# Patient Record
Sex: MALE | Race: WHITE | NOT HISPANIC OR LATINO | URBAN - METROPOLITAN AREA
[De-identification: names, ages, dates, MRNs, and addresses within clinical notes are randomized per-mention and may not be internally consistent; named-entity substitution may affect disease eponyms.]

---
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
## 2025-01-09 ENCOUNTER — HOSPITAL ENCOUNTER (OUTPATIENT)
Facility: MEDICAL CENTER | Age: 20
End: 2025-01-10
Attending: EMERGENCY MEDICINE | Admitting: STUDENT IN AN ORGANIZED HEALTH CARE EDUCATION/TRAINING PROGRAM
Payer: COMMERCIAL

## 2025-01-09 DIAGNOSIS — R45.851 SUICIDAL IDEATION: ICD-10-CM

## 2025-01-09 DIAGNOSIS — T50.902A INTENTIONAL DRUG OVERDOSE, INITIAL ENCOUNTER (HCC): ICD-10-CM

## 2025-01-09 PROBLEM — F99 CO-OCCURRENCE OF MULTIPLE PSYCHIATRIC DISORDERS: Status: ACTIVE | Noted: 2025-01-09

## 2025-01-09 PROBLEM — T14.91XA SUICIDE ATTEMPT (HCC): Status: ACTIVE | Noted: 2025-01-09

## 2025-01-09 LAB
AMPHET UR QL SCN: NEGATIVE
ANION GAP SERPL CALC-SCNC: 14 MMOL/L (ref 7–16)
APAP SERPL-MCNC: <5 UG/ML (ref 10–30)
BARBITURATES UR QL SCN: NEGATIVE
BASOPHILS # BLD AUTO: 0.4 % (ref 0–1.8)
BASOPHILS # BLD: 0.04 K/UL (ref 0–0.12)
BENZODIAZ UR QL SCN: NEGATIVE
BUN SERPL-MCNC: 14 MG/DL (ref 8–22)
BZE UR QL SCN: NEGATIVE
CALCIUM SERPL-MCNC: 8.9 MG/DL (ref 8.5–10.5)
CANNABINOIDS UR QL SCN: NEGATIVE
CHLORIDE SERPL-SCNC: 110 MMOL/L (ref 96–112)
CO2 SERPL-SCNC: 19 MMOL/L (ref 20–33)
CREAT SERPL-MCNC: 0.9 MG/DL (ref 0.5–1.4)
EKG IMPRESSION: NORMAL
EKG IMPRESSION: NORMAL
EOSINOPHIL # BLD AUTO: 0.08 K/UL (ref 0–0.51)
EOSINOPHIL NFR BLD: 0.8 % (ref 0–6.9)
ERYTHROCYTE [DISTWIDTH] IN BLOOD BY AUTOMATED COUNT: 41.3 FL (ref 35.9–50)
ETHANOL BLD-MCNC: 10.9 MG/DL
FENTANYL UR QL: NEGATIVE
GFR SERPLBLD CREATININE-BSD FMLA CKD-EPI: 126 ML/MIN/1.73 M 2
GLUCOSE BLD STRIP.AUTO-MCNC: 105 MG/DL (ref 65–99)
GLUCOSE SERPL-MCNC: 97 MG/DL (ref 65–99)
HCT VFR BLD AUTO: 40.7 % (ref 42–52)
HGB BLD-MCNC: 13.5 G/DL (ref 14–18)
IMM GRANULOCYTES # BLD AUTO: 0.04 K/UL (ref 0–0.11)
IMM GRANULOCYTES NFR BLD AUTO: 0.4 % (ref 0–0.9)
LYMPHOCYTES # BLD AUTO: 2.39 K/UL (ref 1–4.8)
LYMPHOCYTES NFR BLD: 25.1 % (ref 22–41)
MCH RBC QN AUTO: 28.7 PG (ref 27–33)
MCHC RBC AUTO-ENTMCNC: 33.2 G/DL (ref 32.3–36.5)
MCV RBC AUTO: 86.4 FL (ref 81.4–97.8)
METHADONE UR QL SCN: NEGATIVE
MONOCYTES # BLD AUTO: 0.79 K/UL (ref 0–0.85)
MONOCYTES NFR BLD AUTO: 8.3 % (ref 0–13.4)
NEUTROPHILS # BLD AUTO: 6.17 K/UL (ref 1.82–7.42)
NEUTROPHILS NFR BLD: 65 % (ref 44–72)
NRBC # BLD AUTO: 0 K/UL
NRBC BLD-RTO: 0 /100 WBC (ref 0–0.2)
OPIATES UR QL SCN: NEGATIVE
OXYCODONE UR QL SCN: NEGATIVE
PCP UR QL SCN: NEGATIVE
PLATELET # BLD AUTO: 315 K/UL (ref 164–446)
PMV BLD AUTO: 10.8 FL (ref 9–12.9)
POC BREATHALIZER: 0 PERCENT (ref 0–0.01)
POTASSIUM SERPL-SCNC: 4.2 MMOL/L (ref 3.6–5.5)
PROPOXYPH UR QL SCN: NEGATIVE
RBC # BLD AUTO: 4.71 M/UL (ref 4.7–6.1)
SALICYLATES SERPL-MCNC: <1 MG/DL (ref 15–25)
SODIUM SERPL-SCNC: 143 MMOL/L (ref 135–145)
TSH SERPL-ACNC: 0.95 UIU/ML (ref 0.35–5.5)
VIT B12 SERPL-MCNC: 319 PG/ML (ref 211–911)
WBC # BLD AUTO: 9.5 K/UL (ref 4.8–10.8)

## 2025-01-09 PROCEDURE — 82607 VITAMIN B-12: CPT

## 2025-01-09 PROCEDURE — 302970 POC BREATHALIZER: Performed by: EMERGENCY MEDICINE

## 2025-01-09 PROCEDURE — 80048 BASIC METABOLIC PNL TOTAL CA: CPT

## 2025-01-09 PROCEDURE — 700111 HCHG RX REV CODE 636 W/ 250 OVERRIDE (IP): Mod: JZ,UD | Performed by: EMERGENCY MEDICINE

## 2025-01-09 PROCEDURE — 82077 ASSAY SPEC XCP UR&BREATH IA: CPT

## 2025-01-09 PROCEDURE — 84443 ASSAY THYROID STIM HORMONE: CPT

## 2025-01-09 PROCEDURE — 99285 EMERGENCY DEPT VISIT HI MDM: CPT

## 2025-01-09 PROCEDURE — 700111 HCHG RX REV CODE 636 W/ 250 OVERRIDE (IP): Mod: JZ,UD | Performed by: STUDENT IN AN ORGANIZED HEALTH CARE EDUCATION/TRAINING PROGRAM

## 2025-01-09 PROCEDURE — 80307 DRUG TEST PRSMV CHEM ANLYZR: CPT

## 2025-01-09 PROCEDURE — 36415 COLL VENOUS BLD VENIPUNCTURE: CPT

## 2025-01-09 PROCEDURE — 82652 VIT D 1 25-DIHYDROXY: CPT

## 2025-01-09 PROCEDURE — G0378 HOSPITAL OBSERVATION PER HR: HCPCS

## 2025-01-09 PROCEDURE — 99291 CRITICAL CARE FIRST HOUR: CPT | Performed by: STUDENT IN AN ORGANIZED HEALTH CARE EDUCATION/TRAINING PROGRAM

## 2025-01-09 PROCEDURE — 99222 1ST HOSP IP/OBS MODERATE 55: CPT | Performed by: PSYCHIATRY & NEUROLOGY

## 2025-01-09 PROCEDURE — 96375 TX/PRO/DX INJ NEW DRUG ADDON: CPT

## 2025-01-09 PROCEDURE — 93010 ELECTROCARDIOGRAM REPORT: CPT | Performed by: INTERNAL MEDICINE

## 2025-01-09 PROCEDURE — 96376 TX/PRO/DX INJ SAME DRUG ADON: CPT

## 2025-01-09 PROCEDURE — 93005 ELECTROCARDIOGRAM TRACING: CPT | Mod: TC

## 2025-01-09 PROCEDURE — 80143 DRUG ASSAY ACETAMINOPHEN: CPT

## 2025-01-09 PROCEDURE — 93005 ELECTROCARDIOGRAM TRACING: CPT | Mod: TC | Performed by: EMERGENCY MEDICINE

## 2025-01-09 PROCEDURE — 85025 COMPLETE CBC W/AUTO DIFF WBC: CPT

## 2025-01-09 PROCEDURE — 82962 GLUCOSE BLOOD TEST: CPT

## 2025-01-09 PROCEDURE — 96374 THER/PROPH/DIAG INJ IV PUSH: CPT

## 2025-01-09 PROCEDURE — 80179 DRUG ASSAY SALICYLATE: CPT

## 2025-01-09 RX ORDER — ACETAMINOPHEN 325 MG/1
650 TABLET ORAL EVERY 6 HOURS PRN
Status: DISCONTINUED | OUTPATIENT
Start: 2025-01-09 | End: 2025-01-10 | Stop reason: HOSPADM

## 2025-01-09 RX ORDER — ONDANSETRON 2 MG/ML
4 INJECTION INTRAMUSCULAR; INTRAVENOUS ONCE
Status: COMPLETED | OUTPATIENT
Start: 2025-01-09 | End: 2025-01-09

## 2025-01-09 RX ORDER — PROPRANOLOL HYDROCHLORIDE 10 MG/1
10 TABLET ORAL
Status: ON HOLD | COMMUNITY
End: 2025-01-10

## 2025-01-09 RX ORDER — PROCHLORPERAZINE EDISYLATE 5 MG/ML
10 INJECTION INTRAMUSCULAR; INTRAVENOUS EVERY 6 HOURS PRN
Status: DISCONTINUED | OUTPATIENT
Start: 2025-01-09 | End: 2025-01-10 | Stop reason: HOSPADM

## 2025-01-09 RX ORDER — QUETIAPINE FUMARATE 25 MG/1
25 TABLET, FILM COATED ORAL EVERY EVENING
Status: ON HOLD | COMMUNITY
End: 2025-01-10

## 2025-01-09 RX ADMIN — ONDANSETRON 4 MG: 2 INJECTION INTRAMUSCULAR; INTRAVENOUS at 03:29

## 2025-01-09 RX ADMIN — ONDANSETRON 4 MG: 2 INJECTION INTRAMUSCULAR; INTRAVENOUS at 04:16

## 2025-01-09 RX ADMIN — PROCHLORPERAZINE EDISYLATE 10 MG: 5 INJECTION INTRAMUSCULAR; INTRAVENOUS at 15:21

## 2025-01-09 RX ADMIN — PROCHLORPERAZINE EDISYLATE 10 MG: 5 INJECTION INTRAMUSCULAR; INTRAVENOUS at 06:14

## 2025-01-09 SDOH — ECONOMIC STABILITY: TRANSPORTATION INSECURITY
IN THE PAST 12 MONTHS, HAS THE LACK OF TRANSPORTATION KEPT YOU FROM MEDICAL APPOINTMENTS OR FROM GETTING MEDICATIONS?: NO

## 2025-01-09 SDOH — ECONOMIC STABILITY: TRANSPORTATION INSECURITY
IN THE PAST 12 MONTHS, HAS LACK OF RELIABLE TRANSPORTATION KEPT YOU FROM MEDICAL APPOINTMENTS, MEETINGS, WORK OR FROM GETTING THINGS NEEDED FOR DAILY LIVING?: NO

## 2025-01-09 ASSESSMENT — PATIENT HEALTH QUESTIONNAIRE - PHQ9
SUM OF ALL RESPONSES TO PHQ9 QUESTIONS 1 AND 2: 5
4. FEELING TIRED OR HAVING LITTLE ENERGY: MORE THAN HALF THE DAYS
2. FEELING DOWN, DEPRESSED, IRRITABLE, OR HOPELESS: NEARLY EVERY DAY
9. THOUGHTS THAT YOU WOULD BE BETTER OFF DEAD, OR OF HURTING YOURSELF: MORE THAN HALF THE DAYS
1. LITTLE INTEREST OR PLEASURE IN DOING THINGS: MORE THAN HALF THE DAYS
5. POOR APPETITE OR OVEREATING: MORE THAN HALF THE DAYS
SUM OF ALL RESPONSES TO PHQ QUESTIONS 1-9: 21
3. TROUBLE FALLING OR STAYING ASLEEP OR SLEEPING TOO MUCH: MORE THAN HALF THE DAYS
7. TROUBLE CONCENTRATING ON THINGS, SUCH AS READING THE NEWSPAPER OR WATCHING TELEVISION: NEARLY EVERY DAY
8. MOVING OR SPEAKING SO SLOWLY THAT OTHER PEOPLE COULD HAVE NOTICED. OR THE OPPOSITE, BEING SO FIGETY OR RESTLESS THAT YOU HAVE BEEN MOVING AROUND A LOT MORE THAN USUAL: NEARLY EVERY DAY
6. FEELING BAD ABOUT YOURSELF - OR THAT YOU ARE A FAILURE OR HAVE LET YOURSELF OR YOUR FAMILY DOWN: MORE THAN HALF THE DAYS

## 2025-01-09 ASSESSMENT — ENCOUNTER SYMPTOMS
ABDOMINAL PAIN: 0
SHORTNESS OF BREATH: 0
COUGH: 0
HEADACHES: 0
HEARTBURN: 0
DIZZINESS: 0
FEVER: 0
CHILLS: 0
VOMITING: 1
DEPRESSION: 1
DIARRHEA: 0
PALPITATIONS: 0
NAUSEA: 1

## 2025-01-09 ASSESSMENT — LIFESTYLE VARIABLES
EVER FELT BAD OR GUILTY ABOUT YOUR DRINKING: NO
ALCOHOL_USE: YES
EVER HAD A DRINK FIRST THING IN THE MORNING TO STEADY YOUR NERVES TO GET RID OF A HANGOVER: NO
DOES PATIENT WANT TO STOP DRINKING: NO
TOTAL SCORE: 1
HAVE PEOPLE ANNOYED YOU BY CRITICIZING YOUR DRINKING: NO
AVERAGE NUMBER OF DAYS PER WEEK YOU HAVE A DRINK CONTAINING ALCOHOL: 0
ON A TYPICAL DAY WHEN YOU DRINK ALCOHOL HOW MANY DRINKS DO YOU HAVE: 1
TOTAL SCORE: 1
HOW MANY TIMES IN THE PAST YEAR HAVE YOU HAD 5 OR MORE DRINKS IN A DAY: 0
TOTAL SCORE: 1
CONSUMPTION TOTAL: NEGATIVE
HAVE YOU EVER FELT YOU SHOULD CUT DOWN ON YOUR DRINKING: YES

## 2025-01-09 ASSESSMENT — SOCIAL DETERMINANTS OF HEALTH (SDOH)
WITHIN THE LAST YEAR, HAVE TO BEEN RAPED OR FORCED TO HAVE ANY KIND OF SEXUAL ACTIVITY BY YOUR PARTNER OR EX-PARTNER?: NO
WITHIN THE LAST YEAR, HAVE YOU BEEN AFRAID OF YOUR PARTNER OR EX-PARTNER?: NO
WITHIN THE PAST 12 MONTHS, THE FOOD YOU BOUGHT JUST DIDN'T LAST AND YOU DIDN'T HAVE MONEY TO GET MORE: NEVER TRUE
IN THE PAST 12 MONTHS, HAS THE ELECTRIC, GAS, OIL, OR WATER COMPANY THREATENED TO SHUT OFF SERVICE IN YOUR HOME?: NO
WITHIN THE PAST 12 MONTHS, YOU WORRIED THAT YOUR FOOD WOULD RUN OUT BEFORE YOU GOT THE MONEY TO BUY MORE: NEVER TRUE
WITHIN THE LAST YEAR, HAVE YOU BEEN HUMILIATED OR EMOTIONALLY ABUSED IN OTHER WAYS BY YOUR PARTNER OR EX-PARTNER?: NO
WITHIN THE LAST YEAR, HAVE YOU BEEN KICKED, HIT, SLAPPED, OR OTHERWISE PHYSICALLY HURT BY YOUR PARTNER OR EX-PARTNER?: NO

## 2025-01-09 ASSESSMENT — COGNITIVE AND FUNCTIONAL STATUS - GENERAL
CLIMB 3 TO 5 STEPS WITH RAILING: TOTAL
SUGGESTED CMS G CODE MODIFIER MOBILITY: CK
DAILY ACTIVITIY SCORE: 13
STANDING UP FROM CHAIR USING ARMS: A LOT
MOVING TO AND FROM BED TO CHAIR: A LOT
PERSONAL GROOMING: A LOT
CLIMB 3 TO 5 STEPS WITH RAILING: A LOT
DRESSING REGULAR UPPER BODY CLOTHING: A LOT
MOBILITY SCORE: 16
STANDING UP FROM CHAIR USING ARMS: A LOT
SUGGESTED CMS G CODE MODIFIER DAILY ACTIVITY: CL
EATING MEALS: A LITTLE
SUGGESTED CMS G CODE MODIFIER MOBILITY: CL
MOVING FROM LYING ON BACK TO SITTING ON SIDE OF FLAT BED: A LOT
DRESSING REGULAR LOWER BODY CLOTHING: A LOT
MOVING TO AND FROM BED TO CHAIR: A LOT
HELP NEEDED FOR BATHING: A LOT
HELP NEEDED FOR BATHING: A LITTLE
WALKING IN HOSPITAL ROOM: A LOT
TURNING FROM BACK TO SIDE WHILE IN FLAT BAD: A LITTLE
TOILETING: A LOT
DRESSING REGULAR LOWER BODY CLOTHING: A LOT
MOBILITY SCORE: 11
WALKING IN HOSPITAL ROOM: TOTAL

## 2025-01-09 NOTE — ED TRIAGE NOTES
"Homero Mullen   19 y.o.     Chief Complaint   Patient presents with    Drug Overdose     Pt PRIYANKA FOSTER coming from home for attempted suicide by taking medications. Pt states they took oxycarbazepine, vraylar, Seroquel, oxycodone and propanolol. Poison control has been called by EMS and ED RN.   Pt is awake and alert upon arrival to ED. Mild chest pain and tiredness.     Suicidal Ideation     Pt has hx of depression with SI thoughts starting in 2018. Pt states they used to want to run into traffic but no longer think that. Pt's plan was overdosing on medications. When asked if pt is still suicidal they answer saying now they are \"just tired\"     Pt is alert, oriented, and follows commands. Pt speaking in full sentences and responds appropriately to questions. No acute distress noted. Respirations are even and unlabored.     EMS interventions: 1 L NS.    Vitals:    01/09/25 0055   BP: 131/76   Pulse: 97   Resp: 18   Temp: 36.1 °C (97 °F)   SpO2: 96%       "

## 2025-01-09 NOTE — ED NOTES
Pt transported to T7 with ACLS RN connected to monitor. 1:1 sitter to floor with pt. Family at bedside.

## 2025-01-09 NOTE — PROGRESS NOTES
I will be off duty and signed out of voalte at 3pm.  If you have any needs for this patient after 3pm, please reach out to the on call Juliane FINNEGAN.  Thank you!

## 2025-01-09 NOTE — ASSESSMENT & PLAN NOTE
Suicide attempt by drug overdose, see above.  History of suicidal ideation but no previous attempts  IP consult to psych placed, on legal hold

## 2025-01-09 NOTE — ED NOTES
Medication history reviewed with patient's mom at bedside. Patient unable to participate in interview, last doses unknown.  Med rec is complete  Allergies reviewed.     Mom denies any outpatient antibiotics in the last 30 days.     Anticoagulants taken in the last 14 days? No    Patient has active prescription for Strattera 40 mg and 60 mg on rafael, unsure which strength patient currently taking    Radha Manzano PhT

## 2025-01-09 NOTE — ED NOTES
Break RN Breathalyzer 0.00   Subjective


Date Seen by a Provider:  Mar 5, 2019


Time Seen by a Provider:  06:34


Subjective/Events-last exam


Pain ok, already up and walking,  No complaints.





Objective


Exam





Vital Signs








  Date Time  Temp Pulse Resp B/P (MAP) Pulse Ox O2 Delivery O2 Flow Rate FiO2


 


3/5/19 04:41 97.5 79 16 109/64 (79) 99 Room Air  


 


3/5/19 00:00 97.6 60 16 99/65 (76) 99 Room Air  


 


3/4/19 21:00     99 Nasal Cannula 1.50 


 


3/4/19 11:50 98.0 69 18 132/94 98 Room Air  


 


3/4/19 11:46      Room Air  














I & O 


 


 3/5/19





 07:00


 


Intake Total 1888 ml


 


Output Total 1325 ml


 


Balance 563 ml





Capillary Refill :


General Appearance:  No Apparent Distress


Neck:  Non Tender, Supple


Respiratory:  No Accessory Muscle Use, No Respiratory Distress


Cardiovascular:  Regular Rate, Rhythm, Normal Peripheral Pulses


Gastrointestinal:  soft


Extremity:  No Calf Tenderness


Neurologic/Psychiatric:  Alert, Oriented x3, No Motor/Sensory Deficits





Assessment/Plan


Assessment/Plan


Assess & Plan/Chief Complaint


Lumbar Stenosis, Neuro Claudication





Plan: Up with PT, D/C Drain after lunch and D/C Home


Instructions Given





Clinical Quality Measures


DVT/VTE Risk/Contraindication:


Risk Factor Score Per Nursin


RFS Level Per Nursing on Admit:  1=Low/No VTE PPX











SHEREE JOINER MD Mar 5, 2019 06:35

## 2025-01-09 NOTE — ED PROVIDER NOTES
"ED Provider Note    CHIEF COMPLAINT  Chief Complaint   Patient presents with    Drug Overdose     Pt PRIYANKA FOSTER coming from home for attempted suicide by taking medications. Pt states they took oxycarbazepine, vraylar, Seroquel, oxycodone and propanolol. Poison control has been called by EMS and ED RN.   Pt is awake and alert upon arrival to ED. Mild chest pain and tiredness.     Suicidal Ideation       EXTERNAL RECORDS REVIEWED  Outpatient Notes outpatient pulmonary appointment 2/21/2024 reviewed    HPI/ROS  LIMITATION TO HISTORY   Select: : None  OUTSIDE HISTORIAN(S):  Parent mother    Homero Mullen is a 19 y.o. person who presents to the emergency department after intentional drug overdose.  Patient has taken multitude of prescription medications with direct intent for self-harm for \"lethal dose \".  Explains that ingestion included oxcarbazepine, Vraylar, Seroquel, oxycodone and propranolol.  Ingestion at 11:30 PM.  Reports ingestion and intent was secondary to some strained relationships over the last few days.  Did have appointment with outpatient psychiatric provider 2 days ago but things continue to escalate mentally.  Mother at bedside providing additional history        PAST MEDICAL HISTORY       SURGICAL HISTORY  patient denies any surgical history    FAMILY HISTORY  Family History   Problem Relation Age of Onset    Genetic Disorder Father         chromosomal anomaly       SOCIAL HISTORY  Social History     Tobacco Use    Smoking status: Never    Smokeless tobacco: Never   Vaping Use    Vaping status: Never Used   Substance and Sexual Activity    Alcohol use: Never    Drug use: Not on file    Sexual activity: Not on file       CURRENT MEDICATIONS  Home Medications       Reviewed by Ivory Edward R.N. (Registered Nurse) on 01/09/25 at 0056  Med List Status: Partial     Medication Last Dose Status   atomoxetine (STRATTERA) 60 MG capsule  Active   estradiol (ESTRACE) 2 MG Tab  Active   hydrOXYzine HCl " "(ATARAX) 50 MG Tab  Active   minocycline (MINOCIN) 100 MG Cap  Active   OXcarbazepine (TRILEPTAL) 150 MG Tab  Active   risperiDONE (RISPERDAL) 0.5 MG Tab  Active   SODIUM FLUORIDE 5000 PPM 1.1 % Gel  Active   spironolactone (ALDACTONE) 25 MG Tab  Active                    ALLERGIES  No Known Allergies    PHYSICAL EXAM  VITAL SIGNS: BP 98/61   Pulse 73   Temp 36.1 °C (97 °F) (Temporal)   Resp 16   Ht 1.803 m (5' 11\")   Wt 79.4 kg (175 lb)   SpO2 99%   BMI 24.41 kg/m²      Pulse ox interpretation: I interpret this pulse ox as normal.  Constitutional: Alert in no apparent distress.  HENT: No signs of trauma, Bilateral external ears normal, Nose normal.   Eyes: Pupils are equal and reactive  Neck: Normal range of motion, No tenderness, Supple  Cardiovascular: Regular rate and rhythm, no murmurs.   Thorax & Lungs: Normal breath sounds, No respiratory distress  Abdomen: Bowel sounds normal, Soft, No tenderness  Skin: Warm, Dry  Musculoskeletal: Good range of motion in all major joints. No tenderness to palpation or major deformities noted.   Neurologic: Alert , Normal motor function, Normal sensory function, No focal deficits noted.   Psychiatric: Depressed affect.  Poor eye contact.  Admits to intent to harm self        EKG/LABS  Results for orders placed or performed during the hospital encounter of 01/09/25   Urine Drug Screen    Collection Time: 01/09/25  1:48 AM   Result Value Ref Range    Amphetamines Urine Negative Negative    Barbiturates Negative Negative    Benzodiazepines Negative Negative    Cocaine Metabolite Negative Negative    Fentanyl, Urine Negative Negative    Methadone Negative Negative    Opiates Negative Negative    Oxycodone Negative Negative    Phencyclidine -Pcp Negative Negative    Propoxyphene Negative Negative    Cannabinoid Metab Negative Negative   CBC WITH DIFFERENTIAL    Collection Time: 01/09/25  1:50 AM   Result Value Ref Range    WBC 9.5 4.8 - 10.8 K/uL    RBC 4.71 4.70 - 6.10 " M/uL    Hemoglobin 13.5 (L) 14.0 - 18.0 g/dL    Hematocrit 40.7 (L) 42.0 - 52.0 %    MCV 86.4 81.4 - 97.8 fL    MCH 28.7 27.0 - 33.0 pg    MCHC 33.2 32.3 - 36.5 g/dL    RDW 41.3 35.9 - 50.0 fL    Platelet Count 315 164 - 446 K/uL    MPV 10.8 9.0 - 12.9 fL    Neutrophils-Polys 65.00 44.00 - 72.00 %    Lymphocytes 25.10 22.00 - 41.00 %    Monocytes 8.30 0.00 - 13.40 %    Eosinophils 0.80 0.00 - 6.90 %    Basophils 0.40 0.00 - 1.80 %    Immature Granulocytes 0.40 0.00 - 0.90 %    Nucleated RBC 0.00 0.00 - 0.20 /100 WBC    Neutrophils (Absolute) 6.17 1.82 - 7.42 K/uL    Lymphs (Absolute) 2.39 1.00 - 4.80 K/uL    Monos (Absolute) 0.79 0.00 - 0.85 K/uL    Eos (Absolute) 0.08 0.00 - 0.51 K/uL    Baso (Absolute) 0.04 0.00 - 0.12 K/uL    Immature Granulocytes (abs) 0.04 0.00 - 0.11 K/uL    NRBC (Absolute) 0.00 K/uL   BASIC METABOLIC PANEL    Collection Time: 01/09/25  1:50 AM   Result Value Ref Range    Sodium 143 135 - 145 mmol/L    Potassium 4.2 3.6 - 5.5 mmol/L    Chloride 110 96 - 112 mmol/L    Co2 19 (L) 20 - 33 mmol/L    Glucose 97 65 - 99 mg/dL    Bun 14 8 - 22 mg/dL    Creatinine 0.90 0.50 - 1.40 mg/dL    Calcium 8.9 8.5 - 10.5 mg/dL    Anion Gap 14.0 7.0 - 16.0   DIAGNOSTIC ALCOHOL    Collection Time: 01/09/25  1:50 AM   Result Value Ref Range    Diagnostic Alcohol 10.9 (H) <10.1 mg/dL   Salicylate    Collection Time: 01/09/25  1:50 AM   Result Value Ref Range    Salicylates, Quant. <1.0 (L) 15.0 - 25.0 mg/dL   ACETAMINOPHEN    Collection Time: 01/09/25  1:50 AM   Result Value Ref Range    Acetaminophen -Tylenol <5.0 (L) 10.0 - 30.0 ug/mL   ESTIMATED GFR    Collection Time: 01/09/25  1:50 AM   Result Value Ref Range    GFR (CKD-EPI) 126 >60 mL/min/1.73 m 2   POC BREATHALIZER    Collection Time: 01/09/25  2:25 AM   Result Value Ref Range    POC Breathalizer 0.00 0.00 - 0.01 Percent   EKG (NOW)    Collection Time: 01/09/25  2:43 AM   Result Value Ref Range    Report       [facility]    Test Date:  2025  Pt Name:    PABLO SINGLETON               Department: ER  MRN:        [id number]                      Room:       RD 01  Gender:     Male                         Technician: [id number]  :        2005                   Requested By:PARISH ASHLEY  Order #:    [id number]                    Reading MD: Parish Ashley    Measurements  Intervals                                Axis  Rate:       92                           P:          79  DC:         151                          QRS:        69  QRSD:       96                           T:          31  QT:         348  QTc:        431    Interpretive Statements  Sinus rhythm  Borderline Q waves in lateral leads  No previous ECG available for comparison  Electronically Signed On 2025 02:43:05 PST by Parish Ashley     POCT glucose device results    Collection Time: 25  3:23 AM   Result Value Ref Range    POC Glucose, Blood 105 (H) 65 - 99 mg/dL       I have independently interpreted this EKG      COURSE & MEDICAL DECISION MAKING    ASSESSMENT, COURSE AND PLAN  Care Narrative: will complete psych. / tox workup.     ED OBS: Yes; I am placing the patient in to an observation status due to a diagnostic uncertainty as well as therapeutic intensity. Patient placed in observation status at 0115 AM, 2025.     Patient presenting with intentional drug ingestion and overdose.  Will complete workup.  Poison control contacted from triage    Patient has had increasing symptoms to include nausea.  Appears pale cool and diaphoretic.  Vital signs have somewhat worsened with decreasing heart rate and blood pressure.  Will admit to the hospital service at this time as patient is not medically cleared.  Patient will remain on legal hold          DISPOSITION AND DISCUSSIONS  I have discussed management of the patient with the following physicians and EFREN's: 0 410: Discussed with hospitalist which will admit    19-year-old presenting with above  presentation.  Initial workup as above.  Given vital signs and symptoms will admit to the hospitalist service.  Will await further medical clearance for further behavioral health service intervention.    CRITICAL CARE  The very real possibilty of a deterioration of this patient's condition required the highest level of my preparedness for sudden, emergent intervention.  I provided critical care services, which included medication orders, frequent reevaluations of the patient's condition and response to treatment, ordering and reviewing test results, and discussing the case with various consultants.  The critical care time associated with the care of the patient was 35 minutes. Review chart for interventions. This time is exclusive of any other billable procedures.     FINAL DIAGNOSIS  1. Intentional drug overdose, initial encounter ([facility])    2. Suicidal ideation         Electronically signed by: Parish Daniels M.D., 1/9/2025 1:27 AM

## 2025-01-09 NOTE — PROGRESS NOTES
Pt transported to T731-02, vitals signs taken, placed on tele monitor. Pt continues to be lethargic, but easily arousable. 1:1 sitter at bedside, all legal hold precautions in place.

## 2025-01-09 NOTE — PROGRESS NOTES
[facility] Medicine Daily Progress Note    Date of Service  1/9/2025    Chief Complaint  Homero Mullen is a 19 y.o. person admitted 1/9/2025 with intentional overdose.    Hospital Course  Homero Mullen is a 19 y.o. person who presented 1/9/2025 with suicide attempt by drug overdose.  History per chart review and mother as patient is altered.  PMH of psychiatric disorders.  Patient took oxcarbazepine, cariprazine, quetiapine, oxycodone and propranolol at around 11:30 PM.  Increased stress over the past few days, patient's mother received a text just saying I am sorry.  Patient has a group of friends on [employer] and one of them recently committed suicide.     Interval Problem Update  1/9 afebrile, vitals stable, on room air.  Has vomited up a significant amount per nursing.  He is resting upon my exam.  Mom at bedside, tearful.  All questions answered.    I have discussed this patient's plan of care and discharge plan at IDT rounds today with Case Management, Nursing, Nursing leadership, and other members of the IDT team.    Consultants/Specialty  psychiatry    Code Status  Full Code    Disposition  The patient is not medically cleared for discharge to home or a post-acute facility.  Anticipate discharge to: home with close outpatient follow-up    I have placed the appropriate orders for post-discharge needs.    Review of Systems  Review of Systems   Constitutional:  Negative for chills, fever and malaise/fatigue.   Respiratory:  Negative for cough and shortness of breath.    Cardiovascular:  Negative for chest pain, palpitations and leg swelling.   Gastrointestinal:  Positive for nausea and vomiting. Negative for abdominal pain, diarrhea and heartburn.   Genitourinary:  Negative for dysuria, frequency and urgency.   Neurological:  Negative for dizziness and headaches.   Psychiatric/Behavioral:  Positive for depression and suicidal ideas.    All other systems reviewed and are negative.       Physical Exam  Temp:  [36.1  °C (97 °F)] 36.1 °C (97 °F)  Pulse:  [73-97] 81  Resp:  [14-20] 16  BP: ()/(55-93) 114/67  SpO2:  [95 %-100 %] 97 %    Physical Exam  Vitals and nursing note reviewed.   Constitutional:       Appearance: Normal appearance. Homero is not ill-appearing.   HENT:      Head: Normocephalic and atraumatic.      Jaw: There is normal jaw occlusion.      Right Ear: Hearing normal.      Left Ear: Hearing normal.      Nose: Nose normal.      Mouth/Throat:      Lips: Pink.      Mouth: Mucous membranes are moist.   Eyes:      Extraocular Movements: Extraocular movements intact.      Conjunctiva/sclera: Conjunctivae normal.      Pupils: Pupils are equal, round, and reactive to light.   Neck:      Vascular: No carotid bruit.   Cardiovascular:      Rate and Rhythm: Normal rate and regular rhythm.      Pulses: Normal pulses.      Heart sounds: Normal heart sounds, S1 normal and S2 normal.   Pulmonary:      Effort: Pulmonary effort is normal.      Breath sounds: Normal breath sounds and air entry. No stridor.   Musculoskeletal:      Cervical back: Normal range of motion and neck supple.      Right lower leg: No edema.      Left lower leg: No edema.   Skin:     General: Skin is warm and dry.      Capillary Refill: Capillary refill takes less than 2 seconds.   Neurological:      General: No focal deficit present.      Mental Status: Homero is alert and oriented to person, place, and time. Mental status is at baseline.      Sensory: Sensation is intact.      Motor: Motor function is intact.   Psychiatric:         Thought Content: Thought content includes suicidal ideation. Thought content includes suicidal plan.         Fluids  No intake or output data in the 24 hours ending 01/09/25 0719     Laboratory  Recent Labs     01/09/25  0150   WBC 9.5   RBC 4.71   HEMOGLOBIN 13.5*   HEMATOCRIT 40.7*   MCV 86.4   MCH 28.7   MCHC 33.2   RDW 41.3   PLATELETCT 315   MPV 10.8     Recent Labs     01/09/25  0150   SODIUM 143   POTASSIUM 4.2    CHLORIDE 110   CO2 19*   GLUCOSE 97   BUN 14   CREATININE 0.90   CALCIUM 8.9                   Imaging  No orders to display        Assessment/Plan  * Drug overdose, intentional self-harm, initial encounter (HCC)- (present on admission)  Assessment & Plan  Patient took oxcarbazepine, cariprazine, quetiapine, oxycodone and propranolol at around 11:30 PM on 1/8  Poison control contacted by ED staff who recommends monitoring on telemetry but  If QRS becomes prolonged due to propranolol will order bicarb  If QTc becomes prolonged will order magnesium  Need to monitor for urinary retention and constipation    Suicide attempt (HCC)- (present on admission)  Assessment & Plan  Suicide attempt by drug overdose, see above.  History of suicidal ideation but no previous attempts  IP consult to psych placed, on legal hold    Co-occurrence of multiple psychiatric disorders- (present on admission)  Assessment & Plan  Hold home meds for now         VTE prophylaxis:   SCDs/TEDs      I have performed a physical exam and reviewed and updated ROS and Plan today (1/9/2025). In review of yesterday's note (1/8/2025), there are no changes except as documented above.

## 2025-01-09 NOTE — ED NOTES
Legal hold placed 1/8/25 @0112. Pt's mom is at bedside and explained POC.     1:1 sitter in line of sight, items removed from room for safety.

## 2025-01-09 NOTE — H&P
Hospital Medicine History & Physical Note    Date of Service  1/9/2025    Primary Care Physician  Ernestina Galvez M.D.  Code Status  Full Code    Chief Complaint  Chief Complaint   Patient presents with    Drug Overdose     Pt PRIYANKA FOSTER coming from home for attempted suicide by taking medications. Pt states they took oxycarbazepine, vraylar, Seroquel, oxycodone and propanolol. Poison control has been called by EMS and ED RN.   Pt is awake and alert upon arrival to ED. Mild chest pain and tiredness.     Suicidal Ideation       History of Presenting Illness  Homero Mullen is a 19 y.o. person who presented 1/9/2025 with suicide attempt by drug overdose.  History per chart review and mother as patient is altered.  PMH of psychiatric disorders.  Patient took oxcarbazepine, cariprazine, quetiapine, oxycodone and propranolol at around 11:30 PM.  Increased stress over the past few days, patient's mother received a text just saying I am sorry.  Patient has a group of friends on discord and one of them recently committed suicide.    I discussed the plan of care with EDP.    Review of Systems  Review of Systems   Unable to perform ROS: Mental status change     Past Medical History  Psychiatric disorders    Surgical History   has no past surgical history on file.     Family History  family history includes Genetic Disorder in Homero's father.   Family history reviewed with patient. There is no family history that is pertinent to the chief complaint.     Social History   reports that Homero has never smoked. Homero has never used smokeless tobacco. Homero reports that Homero does not drink alcohol.    Allergies  No Known Allergies    Medications  Prior to Admission Medications   Prescriptions Last Dose Informant Patient Reported? Taking?   OXcarbazepine (TRILEPTAL) 150 MG Tab   Yes No   Sig: Take 1 tablet twice a day by oral route for 30 days.   SODIUM FLUORIDE 5000 PPM 1.1 % Gel   Yes No   atomoxetine (STRATTERA) 60 MG  "capsule   Yes No   Sig: TAKE ONE CAPSULE BY MOUTH ONE TIME DAILY for 30 days   estradiol (ESTRACE) 2 MG Tab   Yes No   Sig: Take 2 mg by mouth 2 times a day.   hydrOXYzine HCl (ATARAX) 50 MG Tab   Yes No   minocycline (MINOCIN) 100 MG Cap   Yes No   Sig: Take 100 mg by mouth 2 times a day.   risperiDONE (RISPERDAL) 0.5 MG Tab   Yes No   spironolactone (ALDACTONE) 25 MG Tab   Yes No   Sig: Take 50 mg by mouth 2 times a day.      Facility-Administered Medications: None       Physical Exam  Temp:  [36.1 °C (97 °F)] 36.1 °C (97 °F)  Pulse:  [73-97] 80  Resp:  [14-20] 16  BP: ()/(55-76) 106/72  SpO2:  [95 %-100 %] 100 %  Blood Pressure: 98/61   Temperature: 36.1 °C (97 °F)   Pulse: 73   Respiration: 16   Pulse Oximetry: 99 %       Physical Exam  Constitutional:       Appearance: Homero is diaphoretic.   HENT:      Head: Normocephalic and atraumatic.      Mouth/Throat:      Mouth: Mucous membranes are moist.      Pharynx: No oropharyngeal exudate or posterior oropharyngeal erythema.   Cardiovascular:      Rate and Rhythm: Normal rate and regular rhythm.      Pulses: Normal pulses.      Heart sounds: Normal heart sounds. No murmur heard.  Pulmonary:      Effort: Pulmonary effort is normal. No respiratory distress.      Breath sounds: Normal breath sounds.   Musculoskeletal:         General: No swelling or tenderness. Normal range of motion.   Skin:     General: Skin is warm.      Coloration: Skin is pale.   Neurological:      Mental Status: Homero is alert.      Comments: Somnolent but responds to name         Laboratory:  Recent Labs     01/09/25  0150   WBC 9.5   RBC 4.71   HEMOGLOBIN 13.5*   HEMATOCRIT 40.7*   MCV 86.4   MCH 28.7   MCHC 33.2   RDW 41.3   PLATELETCT 315   MPV 10.8     Recent Labs     01/09/25  0150   SODIUM 143   POTASSIUM 4.2   CHLORIDE 110   CO2 19*   GLUCOSE 97   BUN 14   CREATININE 0.90   CALCIUM 8.9     Recent Labs     01/09/25  0150   GLUCOSE 97         No results for input(s): \"NTPROBNP\" in " "the last 72 hours.      No results for input(s): \"TROPONINT\" in the last 72 hours.    Imaging:  No orders to display       EKG:  I have personally reviewed the images and compared with prior images. and My impression is: NSR    Assessment/Plan:  Justification for Admission Status  I anticipate this patient is appropriate for observation status at this time because suicide attempt by medication overdose, vital stable at this time    * Drug overdose, intentional self-harm, initial encounter (HCC)- (present on admission)  Assessment & Plan  Patient took oxcarbazepine, cariprazine, quetiapine, oxycodone and propranolol at around 11:30 PM on 1/8  Poison control contacted by ED staff who recommends monitoring on telemetry but  If QRS becomes prolonged due to propranolol will order bicarb  If QTc becomes prolonged will order magnesium  Need to monitor for urinary retention and constipation    Suicide attempt (HCC)- (present on admission)  Assessment & Plan  Suicide attempt by drug overdose, see above.  History of suicidal ideation but no previous attempts  IP consult to psych placed, on legal hold    Co-occurrence of multiple psychiatric disorders- (present on admission)  Assessment & Plan  Hold home meds for now    Patient is critically ill.   The patient continues to have: Suicide attempt by multiple drug overdose including antipsychotics and propranolol  If untreated there is a high chance of deterioration And eventually death.   The critical care that I am providing today is: As above.  Need to monitor very closely on telemetry especially with beta-blocker overdose  The critical that has been undertaken is medically complex.   There has been no overlap in critical care time.   Critical Care Time not including procedures: 32 minutes    "

## 2025-01-09 NOTE — ASSESSMENT & PLAN NOTE
Patient took oxcarbazepine, cariprazine, quetiapine, oxycodone and propranolol at around 11:30 PM on 1/8  Poison control contacted by ED staff who recommends monitoring on telemetry but  If QRS becomes prolonged due to propranolol will order bicarb  If QTc becomes prolonged will order magnesium  Need to monitor for urinary retention and constipation

## 2025-01-09 NOTE — ED NOTES
Per pt's mom  Pt's psychiatrist is Dr. Yelitza Estrada at [facility] [phone].    Pt's Therapist is Tamiko Summers at [facility] [phone]

## 2025-01-09 NOTE — HOSPITAL COURSE
Homero Mullen is a 19 y.o. person who presented 1/9/2025 with suicide attempt by drug overdose.  History per chart review and mother as patient is altered.  PMH of psychiatric disorders.  Patient took oxcarbazepine, cariprazine, quetiapine, oxycodone and propranolol at around 11:30 PM on 01/08.  Increased stress over the past few days, patient's mother received a text just saying I am sorry.  Patient has a group of friends on discord and one of them recently committed suicide. No st/t abnormalities on EKG. No changes in QRS or Qtc while monitored on telemetry. No evidence of urinary retention or constipation. Tolerating diet without complication. Psychiatry consulted and recommended transfer pt to inpatient psychiatric hospital. Patients psychotropic medications were held in setting of overdose. Patient was determined satisfactory for discharge with appropriate follow up.

## 2025-01-09 NOTE — PROGRESS NOTES
Pharmacy Note: Poison control updated for polypharmacy OD overdose    Poison control case #:[id number]    Patient took oxcarbazepine, vraylar, seroquel and propranolol in SI attempt.    Labs:  Recent Labs     01/09/25  0150   SODIUM 143   POTASSIUM 4.2   CHLORIDE 110   CO2 19*   BUN 14   CREATININE 0.90   GLUCOSE 97   CALCIUM 8.9            Levels:   01/09/25 01:50   Acetaminophen -Tylenol <5.0 (L)   Diagnostic Alcohol 10.9 (H)   Salicylates, Quant. <1.0 (L)       Recommended Plan:  1. Monitor for hypotension and bradycardia: give fluids and initiate vasopressors to maintain appropriate MAP. Consider atropine for symptomatic bradycardia.    2. If seizures develop given benzodiazepines    3. EKG to monitor QRS. IF QRS becomes > 120 give 1-2 amps of sodium bicarb and optimize electrolytes    4. Observation status for at least 8 hours and asymptomatic.     Albert Mary, PharmD

## 2025-01-09 NOTE — ED NOTES
Bedside report received from prior RN. Pt resting. Resp normal/unlabored on RA. Bed side rails up/in low position.    Family at bedside, POC given    All items removed from room for safety and to minimize risk of suicide. Verified that Legal Hold appropriate and signed in patient's chart. Confirmed patient's personal items removed from room, and if applicable labeled/placed in secure area.        1:1 Observation. Continuous visual monitoring by Trained Personnel. Sitter has unobstructed view of patient at all times. Discussion with sitter about patient care, safety, and support.   Color consistent with ethnicity/race, warm, dry intact, resilient. Ecchymosis and possible deep tissue injury to left upper arm with no tenderness. Mild swelling to area. Pt denies trauma, states it is "leukemia."

## 2025-01-09 NOTE — ED NOTES
Spoke to poison Control Ranjana BONNER     Case# [id number]  Suggestions for Care per Poison Control:     Continuous cardiac monitoring   IV fluids for hypotension  Use vasopressors as needed  Symptomatic juan ---use atropine   With propanolol you will see: QRS widening and seizures.   If QRS is >120 tx with sodium bicarb with 1-2 amps IV push and repeat EKG     With Seroquel   QTC prolongation, >500 1-2 g of IV mag and optimize electrolytes.   Benzos -- For seizures and hypothermia, tachycardia and agitation   Agitation --- bladder scan because they can retain urine.     Observation time is a minimum 8 hours and asymptomatic.

## 2025-01-09 NOTE — ED NOTES
Spoke to poison Control Ranjana BONNER    Case# [id number]  Suggestions for Care per Poison Control:     Continuous cardiac monitoring   IV fluids for hypotension  Use vasopressors as needed  Symptomatic juan ---use atropine   With propanolol you will see: QRS widening and seizures.   If QRS is >120 tx with sodium bicarb with 1-2 amps IV push and repeat EKG    With Seroquel   QTC prolongation, >500 1-2 g of IV mag and optimize electrolytes.   Benzos -- For seizures and hypothermia, tachycardia and agitation   Agitation --- bladder scan because they can retain urine.

## 2025-01-09 NOTE — ED NOTES
Pt ambulated to the bathroom with a steady gait. Denies dizziness/lightheadedness. No balance issues observed. Pt changing into paper scrubs.

## 2025-01-09 NOTE — PROGRESS NOTES
4 Eyes Skin Assessment Completed by HA Cardozo and HA Maldonado.    Head WDL  Ears Jaundice  Nose WDL  Mouth WDL  Neck WDL  Breast/Chest WDL  Shoulder Blades WDL  Spine Redness and Blanching  (R) Arm/Elbow/Hand WDL  (L) Arm/Elbow/Hand WDL  Abdomen WDL  Groin WDL  Scrotum/Coccyx/Buttocks WDL  (R) Leg WDL  (L) Leg WDL  (R) Heel/Foot/Toe WDL  (L) Heel/Foot/Toe WDL          Devices In Places Tele Box, Blood Pressure Cuff, and Pulse Ox      Interventions In Place Pillows    Possible Skin Injury No    Pictures Uploaded Into Epic N/A  Wound Consult Placed N/A  RN Wound Prevention Protocol Ordered No

## 2025-01-09 NOTE — CONSULTS
PSYCHIATRIC INTAKE EVALUATION(new)  Reason for admission: SA by OD on Vraylar, seroquel, trileptal, oxycodone and propranolol   Chief Complaint   Patient presents with    Drug Overdose     Pt PRIYANKA FOSTER coming from home for attempted suicide by taking medications. Pt states they took oxycarbazepine, vraylar, Seroquel, oxycodone and propanolol. Poison control has been called by EMS and ED RN.   Pt is awake and alert upon arrival to ED. Mild chest pain and tiredness.     Suicidal Ideation       Reason for consult:suicide attempt  Requesting Provider:   Bibi Walker M.D.      Legal Hold Status:    on hold         Chart reviewed.         *HPI:     Pt is very sedated, difficult to arouse. Per sitter, pt has been sedated all morning.       Medical ROS (as pertinent):     ROS  unable to obtain       *Psychiatric Examination:  Vitals:   Vitals:    01/09/25 0800   BP: 97/54   Pulse: 80   Resp: 14   Temp:    SpO2: 95%     Unable to assess MSE at this time due to heavy sedation.              Past Medical History:   No past medical history on file.     Past Psychiatric History: per chart  Previous Diagnosis:hx of depression with SI starting in 2018; hx of anxiety. Dx of ASD, AUSTIN on CPAP, KANU   Current meds:strattera, atarax, trileptal, risperdal  Outpatient services:pt's psychiatrist is Dr Yelitza Estrada at [facility] [phone], therapist Tamiko Summers at [facility] [phone]      Family Hx: per chart sister has special needs     Social Hx: per chart: pt was home schooled and has a hx of sensitivity to sensory input; he gets anxious in social situations. He has a sister who has special needs.     Substances:unable to obtain       Current Medications:  Current Facility-Administered Medications   Medication Dose Route Frequency Provider Last Rate Last Admin    acetaminophen (Tylenol) tablet 650 mg  650 mg Oral Q6HRS PRN Bibi Walker M.D.        prochlorperazine (Compazine) injection 10 mg  10  mg Intravenous Q6HRS PRN Bibi Walker M.D.   10 mg at 01/09/25 0614     Current Outpatient Medications   Medication Sig Dispense Refill    cariprazine (VRAYLAR) 1.5 MG capsule Take 1.5 mg by mouth every day.      QUEtiapine (SEROQUEL) 25 MG Tab Take 25 mg by mouth every evening.      estradiol (ESTRACE) 2 MG Tab Take 4 mg by mouth 2 times a day. 4 mg = 2 tablets      spironolactone (ALDACTONE) 100 MG Tab Take 100 mg by mouth 2 times a day.      OXcarbazepine (TRILEPTAL) 150 MG Tab Take 150 mg by mouth 2 times a day.      ATOMOXETINE HCL PO Take 1 Capsule by mouth every day.      hydrOXYzine HCl (ATARAX) 50 MG Tab Take 50 mg by mouth at bedtime.          Allergies:  Patient has no known allergies.       Labs personally reviewed: UDS negative  Recent Results (from the past 72 hours)   Urine Drug Screen    Collection Time: 01/09/25  1:48 AM   Result Value Ref Range    Amphetamines Urine Negative Negative    Barbiturates Negative Negative    Benzodiazepines Negative Negative    Cocaine Metabolite Negative Negative    Fentanyl, Urine Negative Negative    Methadone Negative Negative    Opiates Negative Negative    Oxycodone Negative Negative    Phencyclidine -Pcp Negative Negative    Propoxyphene Negative Negative    Cannabinoid Metab Negative Negative   CBC WITH DIFFERENTIAL    Collection Time: 01/09/25  1:50 AM   Result Value Ref Range    WBC 9.5 4.8 - 10.8 K/uL    RBC 4.71 4.70 - 6.10 M/uL    Hemoglobin 13.5 (L) 14.0 - 18.0 g/dL    Hematocrit 40.7 (L) 42.0 - 52.0 %    MCV 86.4 81.4 - 97.8 fL    MCH 28.7 27.0 - 33.0 pg    MCHC 33.2 32.3 - 36.5 g/dL    RDW 41.3 35.9 - 50.0 fL    Platelet Count 315 164 - 446 K/uL    MPV 10.8 9.0 - 12.9 fL    Neutrophils-Polys 65.00 44.00 - 72.00 %    Lymphocytes 25.10 22.00 - 41.00 %    Monocytes 8.30 0.00 - 13.40 %    Eosinophils 0.80 0.00 - 6.90 %    Basophils 0.40 0.00 - 1.80 %    Immature Granulocytes 0.40 0.00 - 0.90 %    Nucleated RBC 0.00 0.00 - 0.20 /100 WBC    Neutrophils  (Absolute) 6.17 1.82 - 7.42 K/uL    Lymphs (Absolute) 2.39 1.00 - 4.80 K/uL    Monos (Absolute) 0.79 0.00 - 0.85 K/uL    Eos (Absolute) 0.08 0.00 - 0.51 K/uL    Baso (Absolute) 0.04 0.00 - 0.12 K/uL    Immature Granulocytes (abs) 0.04 0.00 - 0.11 K/uL    NRBC (Absolute) 0.00 K/uL   BASIC METABOLIC PANEL    Collection Time: 25  1:50 AM   Result Value Ref Range    Sodium 143 135 - 145 mmol/L    Potassium 4.2 3.6 - 5.5 mmol/L    Chloride 110 96 - 112 mmol/L    Co2 19 (L) 20 - 33 mmol/L    Glucose 97 65 - 99 mg/dL    Bun 14 8 - 22 mg/dL    Creatinine 0.90 0.50 - 1.40 mg/dL    Calcium 8.9 8.5 - 10.5 mg/dL    Anion Gap 14.0 7.0 - 16.0   DIAGNOSTIC ALCOHOL    Collection Time: 25  1:50 AM   Result Value Ref Range    Diagnostic Alcohol 10.9 (H) <10.1 mg/dL   Salicylate    Collection Time: 25  1:50 AM   Result Value Ref Range    Salicylates, Quant. <1.0 (L) 15.0 - 25.0 mg/dL   ACETAMINOPHEN    Collection Time: 25  1:50 AM   Result Value Ref Range    Acetaminophen -Tylenol <5.0 (L) 10.0 - 30.0 ug/mL   ESTIMATED GFR    Collection Time: 25  1:50 AM   Result Value Ref Range    GFR (CKD-EPI) 126 >60 mL/min/1.73 m 2   POC BREATHALIZER    Collection Time: 25  2:25 AM   Result Value Ref Range    POC Breathalizer 0.00 0.00 - 0.01 Percent   EKG (NOW)    Collection Time: 25  2:43 AM   Result Value Ref Range    Report       [facility] Emergency Dept.    Test Date:  2025  Pt Name:    PABLO SINGLETON               Department: ER  MRN:        [id number]                      Room:       [facility]  Gender:     Male                         Technician: [id number]  :        2005                   Requested By:PARISH ASHLEY  Order #:    [id number]                    Reading MD: Parish Ashley    Measurements  Intervals                                Axis  Rate:       92                           P:          79  MN:         151                          QRS:        69  QRSD:       96                            T:          31  QT:         348  QTc:        431    Interpretive Statements  Sinus rhythm  Borderline Q waves in lateral leads  No previous ECG available for comparison  Electronically Signed On 01- 02:43:05 PST by Parish Daniels     POCT glucose device results    Collection Time: 01/09/25  3:23 AM   Result Value Ref Range    POC Glucose, Blood 105 (H) 65 - 99 mg/dL           EKG: , SR  Brain Imaging: not done   EEG:  n/a         Assessment:Pt with a hx of MDD, KANU and ASD, who attempted SA by OD in the context of strained relationship. Pt today is heavily sedated, unable to participate in evaluation. Due to serious SA, t at this time continues to have an acute elevated risk of danger to self and others, therefore will extend legal hold for further psychiatric stabilization.         Dx:  Unspecified depressive disorder   R/o MDD  Hx of KANU  Hx of ASD    Medical:    AUSTIN on CPAP    Plan:  Legal hold:extended  Psychotropic medications: hold psychotropic medications at this time due to OD. Monitor for NMS and other SE.   Please transfer pt to inpatient psychiatric hospital when medically cleared and bed is available  Pt is being referred to inpatient psychotherapy with Dr Toney  Labs:obtain TSH, B12 and Vit D  Old records ordered/reviewed/summarized   Discussed the case with: KAIN Perez   Psychiatry will follow up    Thank you for the consult.     Sitter:1:1  Phone:no  Visitors:parents only, supervised  Personal belongings: no    This note was created using voice recognition software (Dragon). The accuracy of the dictation is limited by the abilities of the software. I have reviewed the note prior to signing. However, error related to voice recognition software and /or scribes may still exist and should be interpreted within the appropriate context.

## 2025-01-10 VITALS
HEART RATE: 85 BPM | OXYGEN SATURATION: 95 % | HEIGHT: 71 IN | RESPIRATION RATE: 18 BRPM | WEIGHT: 188.49 LBS | TEMPERATURE: 98.6 F | SYSTOLIC BLOOD PRESSURE: 132 MMHG | BODY MASS INDEX: 26.39 KG/M2 | DIASTOLIC BLOOD PRESSURE: 87 MMHG

## 2025-01-10 LAB
ANION GAP SERPL CALC-SCNC: 14 MMOL/L (ref 7–16)
BUN SERPL-MCNC: 9 MG/DL (ref 8–22)
CALCIUM SERPL-MCNC: 8.5 MG/DL (ref 8.5–10.5)
CHLORIDE SERPL-SCNC: 108 MMOL/L (ref 96–112)
CO2 SERPL-SCNC: 20 MMOL/L (ref 20–33)
CREAT SERPL-MCNC: 0.6 MG/DL (ref 0.5–1.4)
ERYTHROCYTE [DISTWIDTH] IN BLOOD BY AUTOMATED COUNT: 44.9 FL (ref 35.9–50)
GFR SERPLBLD CREATININE-BSD FMLA CKD-EPI: 142 ML/MIN/1.73 M 2
GLUCOSE SERPL-MCNC: 85 MG/DL (ref 65–99)
HCT VFR BLD AUTO: 38.3 % (ref 42–52)
HGB BLD-MCNC: 12.2 G/DL (ref 14–18)
MCH RBC QN AUTO: 28.4 PG (ref 27–33)
MCHC RBC AUTO-ENTMCNC: 31.9 G/DL (ref 32.3–36.5)
MCV RBC AUTO: 89.1 FL (ref 81.4–97.8)
PLATELET # BLD AUTO: 244 K/UL (ref 164–446)
PMV BLD AUTO: 10.6 FL (ref 9–12.9)
POTASSIUM SERPL-SCNC: 3.7 MMOL/L (ref 3.6–5.5)
RBC # BLD AUTO: 4.3 M/UL (ref 4.7–6.1)
SODIUM SERPL-SCNC: 142 MMOL/L (ref 135–145)
WBC # BLD AUTO: 9 K/UL (ref 4.8–10.8)

## 2025-01-10 PROCEDURE — 99239 HOSP IP/OBS DSCHRG MGMT >30: CPT | Performed by: INTERNAL MEDICINE

## 2025-01-10 PROCEDURE — G0378 HOSPITAL OBSERVATION PER HR: HCPCS

## 2025-01-10 PROCEDURE — 36415 COLL VENOUS BLD VENIPUNCTURE: CPT

## 2025-01-10 PROCEDURE — 96376 TX/PRO/DX INJ SAME DRUG ADON: CPT

## 2025-01-10 PROCEDURE — 700111 HCHG RX REV CODE 636 W/ 250 OVERRIDE (IP): Mod: JZ,UD | Performed by: STUDENT IN AN ORGANIZED HEALTH CARE EDUCATION/TRAINING PROGRAM

## 2025-01-10 PROCEDURE — 80048 BASIC METABOLIC PNL TOTAL CA: CPT

## 2025-01-10 PROCEDURE — 85027 COMPLETE CBC AUTOMATED: CPT

## 2025-01-10 RX ADMIN — PROCHLORPERAZINE EDISYLATE 10 MG: 5 INJECTION INTRAMUSCULAR; INTRAVENOUS at 05:58

## 2025-01-10 ASSESSMENT — ENCOUNTER SYMPTOMS
CHILLS: 0
VOMITING: 0
DIZZINESS: 0
DOUBLE VISION: 0
SORE THROAT: 0
ABDOMINAL PAIN: 0
PALPITATIONS: 0
HEADACHES: 0
DIARRHEA: 0
NAUSEA: 0
DEPRESSION: 1
COUGH: 0
BLURRED VISION: 0
FEVER: 0
SHORTNESS OF BREATH: 0

## 2025-01-10 NOTE — DISCHARGE PLANNING
Alert Team/Behavioral Health   Note:      POWER ALERT TEAM DISCHARGE PLANNING NOTE    Date:  1/10/25  Patient Name:  Homero Mullen - 19 y.o. - Discharge Planning  MRN:  [id number]   YOB: 2005  ADMISSION DATE:  1/9/2025     Writer forwarded referral packet for inpatient psychiatric care to the following community providers:  [facility], Abdirashid SOLIZ    Items included in the referral packet:   _x____Face Sheet   _x____Pages 1 and 2 of completed legal hold   _x____Alert Team/Psych Assessment   _x____H&P   _x____UDS   _x____Blood Alcohol   _x____Vital signs   _n/a____Pregnancy Test (if applicable)   _x____Medications List   _____Covid Screen

## 2025-01-10 NOTE — DISCHARGE PLANNING
Alert Team/Behavioral Health   Note:      - [facility]:  (Rhoda) called. Needs provider note from today indicating medical clearance and inquired if patient has own CPAP machine. Notified Attending Provider (James DOMINGUEZ), Bedside RN (Elaine WALKER), RN Case Manager (Jaziel TSE), and Alert Team DPA (Mariza CARDOZA) via Voalte message.  @1340: RN Case Manager (Jaziel TSE) reports that talked to patient and patient has not used their CPAP in a year. Notified Intake Counselor (Adali) at [facility] and still awaiting provider note.  @1425: Talked to Intake Counselor (Gino). They will get back to us when they have an update.

## 2025-01-10 NOTE — DISCHARGE SUMMARY
Discharge Summary    CHIEF COMPLAINT ON ADMISSION  Chief Complaint   Patient presents with    Drug Overdose     Pt PRIYANKA FOSTER coming from home for attempted suicide by taking medications. Pt states they took oxycarbazepine, vraylar, Seroquel, oxycodone and propanolol. Poison control has been called by EMS and ED RN.   Pt is awake and alert upon arrival to ED. Mild chest pain and tiredness.     Suicidal Ideation       Reason for Admission  ems     Admission Date  1/9/2025    CODE STATUS  Full Code    HPI & HOSPITAL COURSE  Homero Mullen is a 19 y.o. person who presented 1/9/2025 with suicide attempt by drug overdose.  History per chart review and mother as patient is altered.  PMH of psychiatric disorders.  Patient took oxcarbazepine, cariprazine, quetiapine, oxycodone and propranolol at around 11:30 PM on 01/08.  Increased stress over the past few days, patient's mother received a text just saying I am sorry.  Patient has a group of friends on discord and one of them recently committed suicide. No st/t abnormalities on EKG. No changes in QRS or Qtc while monitored on telemetry. No evidence of urinary retention or constipation. Tolerating diet without complication. Psychiatry consulted and recommended transfer pt to inpatient psychiatric hospital. Patients psychotropic medications were held in setting of overdose. Patient was determined satisfactory for discharge with appropriate follow up.    Therefore, Homero is discharged in fair and stable condition to a psychiatric hospital.    The patient recovered much more quickly than anticipated on admission.    Discharge Date  01/10/2025    FOLLOW UP ITEMS POST DISCHARGE  Please follow up with PCP in 3-5 days for post hospitalization follow up and medication reconciliation.     DISCHARGE DIAGNOSES  Principal Problem:    Drug overdose, intentional self-harm, initial encounter (HCC) (POA: Yes)  Active Problems:    Co-occurrence of multiple psychiatric disorders (POA: Yes)     Suicide attempt (HCC) (POA: Yes)  Resolved Problems:    * No resolved hospital problems. *      FOLLOW UP  No future appointments.  Ernestina Galvez M.D.  [address] [facility] [address] [facility]  [place] [id number] [phone]    Call  As needed    [facility]  [address]  [place] [address]  [phone]  Go to        MEDICATIONS ON DISCHARGE     Medication List        STOP taking these medications      ATOMOXETINE HCL PO     estradiol 2 MG Tabs  Commonly known as: Estrace     hydrOXYzine HCl 50 MG Tabs  Commonly known as: Atarax     OXcarbazepine 150 MG Tabs  Commonly known as: Trileptal     propranolol 10 MG Tabs  Commonly known as: Inderal     QUEtiapine 25 MG Tabs  Commonly known as: SEROquel     spironolactone 100 MG Tabs  Commonly known as: Aldactone     Vraylar 1.5 MG capsule  Generic drug: cariprazine              Allergies  Allergies   Allergen Reactions    Celery Oil Vomiting       DIET  Orders Placed This Encounter   Procedures    Diet Order Diet: Regular; Tray Modifications (optional): Safety Tray - Plastic dishware, utensils unwrapped (Suicide Watch)     Paperware only on meal tray.     Standing Status:   Standing     Number of Occurrences:   1     Order Specific Question:   Diet:     Answer:   Regular [1]     Order Specific Question:   Tray Modifications (optional)     Answer:   Safety Tray - Plastic dishware, utensils unwrapped (Suicide Watch)       LABORATORY  Lab Results   Component Value Date    SODIUM 142 01/10/2025    POTASSIUM 3.7 01/10/2025    CHLORIDE 108 01/10/2025    CO2 20 01/10/2025    GLUCOSE 85 01/10/2025    BUN 9 01/10/2025    CREATININE 0.60 01/10/2025        Lab Results   Component Value Date    WBC 9.0 01/10/2025    HEMOGLOBIN 12.2 (L) 01/10/2025    HEMATOCRIT 38.3 (L) 01/10/2025    PLATELETCT 244 01/10/2025        Total time of the discharge process exceeds 35 minutes.

## 2025-01-10 NOTE — CONSULTS
Patient is a 19 year old male who present following SA via overdose on oxcarbazepine, cariprazine, quetiapine, oxycodone and propranolol prior to arrival. Previous unable to participate meaningfully in psychiatric assessment 2/2 somnolence.     Chart reviewed, agree with previous recommendation to hold psychotropic medication 2/2 concern for NMS. Patient accepted to [facility] facility prior to assessment.     Agree with previous recommendations for restriction:  1:1 Sitter   No phone  Parents only with supervision  No personal belongings

## 2025-01-10 NOTE — DISCHARGE PLANNING
Legal Hold Transfer     Referral: Legal hold transfer to Mental Health Facility     Intervention: Notified by  Franca that pt has been accepted to [facility].     Pt's accepting physcian is Dr. Jamila Oden to [place] [facility]     Transport arranged through [facility]     The pt will be picked up at 1600      Notified Bedside HA Henry, HA Sheriff, and Dr. Ramirez of the departure time as well as accepting facility.      Transfer packet to be created with original legal hold and placed on chart.      Plan: Pt will be transferring to [facility] today at 1600 via  [facility].

## 2025-01-10 NOTE — PROGRESS NOTES
Report received from day shift RN, assumed care of pt. Pt A&Ox 4. Plan of care discussed with pt, labs and chart reviewed. All needs met at this time. Tele box on. On room air. Call light within reach, bed locked and in lowest position. All fall precautions and hourly rounding in place.

## 2025-01-10 NOTE — CARE PLAN
The patient is Stable - Low risk of patient condition declining or worsening    Shift Goals  Clinical Goals: Safety, decreased SI  Patient Goals: sleep and no lights  Family Goals: updates    Progress made toward(s) clinical / shift goals:    Problem: Knowledge Deficit - Standard  Goal: Patient and family/care givers will demonstrate understanding of plan of care, disease process/condition, diagnostic tests and medications  Outcome: Progressing     Problem: Provide Safe Environment  Goal: Suicide environmental safety, protocols, policies, and practices will be implemented  Outcome: Progressing     Problem: Fall Risk  Goal: Patient will remain free from falls  Outcome: Progressing       Patient is not progressing towards the following goals:

## 2025-01-10 NOTE — PROGRESS NOTES
[facility] Medicine Daily Progress Note    Date of Service  1/10/2025    Chief Complaint  Homero Mullen is a 19 y.o. person admitted 1/9/2025 with intentional overdose.    Hospital Course  Homero Mullen is a 19 y.o. person who presented 1/9/2025 with suicide attempt by drug overdose.  History per chart review and mother as patient is altered.  PMH of psychiatric disorders.  Patient took oxcarbazepine, cariprazine, quetiapine, oxycodone and propranolol at around 11:30 PM.  Increased stress over the past few days, patient's mother received a text just saying I am sorry.  Patient has a group of friends on discCasabi and one of them recently committed suicide.     Interval Problem Update  Patient was seen and examined at bedside.  No acute events overnight. Patient is resting comfortably in bed and in no acute distress. Mother updated at bedside. Bedside sitter present.     No events on tele  Denies suicidal ideation  Endorses bowel movement, tolerating diet    I have discussed this patient's plan of care and discharge plan at IDT rounds today with Case Management, Nursing, Nursing leadership, and other members of the IDT team.    Consultants/Specialty  psychiatry    Code Status  Full Code    Disposition  The patient is medically cleared for discharge to home or a post-acute facility.  Anticipate discharge to: a psychiatric hospital    I have placed the appropriate orders for post-discharge needs.    Review of Systems  Review of Systems   Constitutional:  Negative for chills, fever and malaise/fatigue.   HENT:  Negative for congestion and sore throat.    Eyes:  Negative for blurred vision and double vision.   Respiratory:  Negative for cough and shortness of breath.    Cardiovascular:  Negative for chest pain, palpitations and leg swelling.   Gastrointestinal:  Negative for abdominal pain, diarrhea, nausea and vomiting.   Genitourinary:  Negative for dysuria and frequency.   Neurological:  Negative for dizziness and  headaches.   Psychiatric/Behavioral:  Positive for depression. Negative for suicidal ideas.    All other systems reviewed and are negative.       Physical Exam  Temp:  [36.1 °C (97 °F)-36.8 °C (98.2 °F)] 36.6 °C (97.9 °F)  Pulse:  [70-99] 89  Resp:  [12-18] 18  BP: ()/(50-85) 116/70  SpO2:  [94 %-99 %] 94 %    Physical Exam  Vitals and nursing note reviewed.   Constitutional:       Appearance: Normal appearance. Homero is not ill-appearing.      Comments: Pleasant, conversational   HENT:      Head: Normocephalic and atraumatic.      Jaw: There is normal jaw occlusion.      Right Ear: Hearing and external ear normal.      Left Ear: Hearing and external ear normal.      Nose: Nose normal.      Mouth/Throat:      Lips: Pink.      Mouth: Mucous membranes are moist.   Eyes:      General: No scleral icterus.     Extraocular Movements: Extraocular movements intact.      Pupils: Pupils are equal, round, and reactive to light.   Neck:      Vascular: No carotid bruit.   Cardiovascular:      Rate and Rhythm: Normal rate and regular rhythm.      Pulses: Normal pulses.      Heart sounds: Normal heart sounds, S1 normal and S2 normal. No murmur heard.  Pulmonary:      Effort: Pulmonary effort is normal.      Breath sounds: Normal breath sounds and air entry. No wheezing.   Abdominal:      Tenderness: There is no abdominal tenderness. There is no guarding or rebound.   Musculoskeletal:      Cervical back: Normal range of motion and neck supple.      Right lower leg: No edema.      Left lower leg: No edema.   Skin:     General: Skin is warm and dry.      Capillary Refill: Capillary refill takes less than 2 seconds.   Neurological:      General: No focal deficit present.      Mental Status: Homero is alert and oriented to person, place, and time. Mental status is at baseline.      Sensory: Sensation is intact.      Motor: Motor function is intact.   Psychiatric:         Thought Content: Thought content does not include suicidal  ideation. Thought content does not include suicidal plan.         Fluids    Intake/Output Summary (Last 24 hours) at 1/10/2025 1352  Last data filed at 1/10/2025 0740  Gross per 24 hour   Intake 400 ml   Output 535 ml   Net -135 ml        Laboratory  Recent Labs     01/09/25  0150 01/10/25  0220   WBC 9.5 9.0   RBC 4.71 4.30*   HEMOGLOBIN 13.5* 12.2*   HEMATOCRIT 40.7* 38.3*   MCV 86.4 89.1   MCH 28.7 28.4   MCHC 33.2 31.9*   RDW 41.3 44.9   PLATELETCT 315 244   MPV 10.8 10.6     Recent Labs     01/09/25  0150 01/10/25  0220   SODIUM 143 142   POTASSIUM 4.2 3.7   CHLORIDE 110 108   CO2 19* 20   GLUCOSE 97 85   BUN 14 9   CREATININE 0.90 0.60   CALCIUM 8.9 8.5                   Imaging  No orders to display        Assessment/Plan  * Drug overdose, intentional self-harm, initial encounter (HCC)- (present on admission)  Assessment & Plan  Patient took oxcarbazepine, cariprazine, quetiapine, oxycodone and propranolol at around 11:30 PM on 1/8  Poison control contacted by ED staff who recommends monitoring on telemetry but  If QRS becomes prolonged due to propranolol will order bicarb  If QTc becomes prolonged will order magnesium  Need to monitor for urinary retention and constipation    Suicide attempt (HCC)- (present on admission)  Assessment & Plan  Suicide attempt by drug overdose, see above.  History of suicidal ideation but no previous attempts  IP consult to psych placed, on legal hold    Co-occurrence of multiple psychiatric disorders- (present on admission)  Assessment & Plan  Hold home meds for now         VTE prophylaxis:   SCDs/TEDs      I have performed a physical exam and reviewed and updated ROS and Plan today (1/10/2025). In review of yesterday's note (1/9/2025), there are no changes except as documented above.    Greater than 51 minutes spent prepping to see patient (e.g. review of tests) obtaining and/or reviewing separately obtained history. Performing a medically appropriate examination and/  evaluation.  Counseling and educating the patient/family/caregiver.  Ordering medications, tests, or procedures.  Referring and communicating with other health care professionals.  Documenting clinical information in EPIC.  Independently interpreting results and communicating results to patient/family/caregiver.  Care coordination.

## 2025-01-10 NOTE — PROGRESS NOTES
Bedside report received and patient care assumed. Pt is resting in bed, A&O4, with no complaints of pain, and is on RA. No SI and no plan or intent. Tele box on. All fall precautions are in place. Pt was updated on POC, no questions or concerns.

## 2025-01-10 NOTE — CARE PLAN
The patient is Watcher - Medium risk of patient condition declining or worsening    Shift Goals  Clinical Goals: decreased SI, safety, monitor tele for prolonged QTc  Patient Goals: decrease nausea  Family Goals: updates    Pt has a 1:1 sitter and pt has not said stated SI or intent to harm self. Pt is being treated for nausea per MAR.    Progress made toward(s) clinical / shift goals:    Problem: Knowledge Deficit - Standard  Goal: Patient and family/care givers will demonstrate understanding of plan of care, disease process/condition, diagnostic tests and medications  Description: Target End Date:  1-3 days or as soon as patient condition allows    Document in Patient Education    1.  Patient and family/caregiver oriented to unit, equipment, visitation policy and means for communicating concern  2.  Complete/review Learning Assessment  3.  Assess knowledge level of disease process/condition, treatment plan, diagnostic tests and medications  4.  Explain disease process/condition, treatment plan, diagnostic tests and medications  1/9/2025 1629 by Elaine May, R.N.  Outcome: Progressing  1/9/2025 1616 by Elaine May R.N.  Outcome: Progressing     Problem: Provide Safe Environment  Goal: Suicide environmental safety, protocols, policies, and practices will be implemented  Description: Target End Date:  resolve day 1    1.  Remove objects or personal belongings that may cause harm or injury to self or others  2.  Dietary tray modifications (paperware)  3.  Provide a safe environment  4.  Render close patient supervision by sustaining observation or awareness of the patient at all times  1/9/2025 1629 by Elaine May, R.N.  Outcome: Progressing  1/9/2025 1616 by Elaine May, R.N.  Outcome: Progressing       Patient is not progressing towards the following goals:      Problem: Psychosocial  Goal: Patient's ability to identify and develop effective coping behaviors will improve  Description: Target End  Date:  1 to 3 days    1.  Present opportunities for the patient to express thoughts, and feelings in a nonjudgmental environment  2.  Help the patient with problem-solving in a constructive manner.  3.  Educate the patient on cognitive-behavioral self-management responses to suicidal thoughts.  4.  Introduce the use of self-expression methods to manage suicidal feelings  5.  Provide emotional support  6.  Encourage identification of positive aspects of self  1/9/2025 1629 by Elaine May, R.N.  Outcome: Not Progressing  1/9/2025 1616 by Eliane May, R.N.  Outcome: Not Progressing  Goal: Patient's ability to identify and utilize available support systems will improve  Description: Target End Date:  1 to 3 days    1.  Help patient identify available resources and support systems  2.  Collaborate with interdisciplinary team  3.  Collaborate with patient, family/caregiver and other support systems  1/9/2025 1629 by Elaine May, R.N.  Outcome: Not Progressing  1/9/2025 1616 by Elaine May, R.N.  Outcome: Not Progressing     Problem: Depression  Goal: Patient and family/caregiver will verbalize accurate information about at least two of the possible causes of depression, three-four of the signs and symptoms of depression  Description: Target End Date:  1 to 3 days    1.  Assess the patient's and family/caregiver's knowledge regarding depression and its causes.  2.  Explain to the patient and family/caregiver regarding the major symptoms of depression.  3.  Inform the patient and family/caregiver that depression can be treated through medications and psychotherapy.  4.  Allow the patient to express feelings and perceptions  5.  Express hope to the patient with realistic comments about the patient's strengths and resources.  5.  Give positive feedback after a tasks are achieved.  6.  Encourage identification of positive aspects of self.  7.  Educate the patient about crisis intervention services such as  suicide hotlines and other resources.  1/9/2025 1629 by Elaine May, R.N.  Outcome: Not Progressing  1/9/2025 1616 by Elaine May, R.N.  Outcome: Not Progressing

## 2025-01-10 NOTE — DISCHARGE PLANNING
Received Verified Involuntary Court Ordered Petition from the court. Scanned copy of Petition into pt's chart.       Attending Attestation (For Attendings USE Only)...

## 2025-01-10 NOTE — DISCHARGE PLANNING
Case Management Discharge Planning    Admission Date: 1/9/2025  GMLOS:    ALOS: 0    6-Clicks ADL Score: 13  6-Clicks Mobility Score: 11  PT and/or OT Eval ordered: No  Post-acute Referrals Ordered: No  Post-acute Choice Obtained: No  Has referral(s) been sent to post-acute provider:  No      Anticipated Discharge Dispo: Discharge Disposition: D/T to psych hosp or distinct part unit (65)    DME Needed: No    Action(s) Taken: Updated Provider/Nurse on Discharge Plan and DC Assessment Complete (See below)    1149, Legal hold papers with Medical Clearance fax to Mariza FERRO from legal hold department.    1330, Pt was visited at room to obtain assessment information and discuss discharge planning. Pt said his last use of CPAP was last January 2024. He said he does not use it because it does not help.    1445, Pt for discharge to [facility] at 1600 via [facility]. Transfer packet will be prepared by Mariza FERRO from Legal hold department.      Escalations Completed: None    Medically Clear: Yes    Next Steps: CM will continue to assist Pt with discharge needs.      Barriers to Discharge: Pending Placement    Care Transition Team Assessment  RN CM met with Pt at bedside to obtain assessment informations. Demographics verified. RN CM introduced self and purpose of visit.   Pt lives with roommates in a multistory apartment on the 4th level with an elevator access.  Pt has mother, sister and grand parents for support.  Pt has PCP from [facility] but he forgot the name.  Pt was independent with ADLs prior to hospitalization.      Information Source  Orientation Level: Oriented X4  Information Given By: Patient  Informant's Name: Avawam Caitlin    Elopement Risk  Legal Hold: Elopement Risk  Ambulatory or Self Mobile in Wheelchair: Yes  Disoriented: No  Psychiatric Symptoms: None  History of Wandering: No  Elopement this Admit: No  Vocalizing Wanting to Leave: No  Displays Behaviors, Body Language Wanting to Leave: No-Not at  Risk for Elopement  Time of Legal Hold: 0112  Date of Legal Hold: 01/09/25  Elopement Risk: Not at Risk for Elopement  Wanderguard On: No (See Comments) (1:1 sitter)  Personal Belongings: Hospital Clothing Only, Possessions Checked for Security / Elopement Risk, Anything Considered Risk for Security or Elopement, Removed and Stored in Secure Area (Specify Area)  Environmental Precautions: Sharp or Dangerous Items Removed    Interdisciplinary Discharge Planning  Primary Care Physician: PCP from [facility]  Lives with - Patient's Self Care Capacity: Unrelated Adult (roommates)  Patient or legal guardian wants to designate a caregiver: Yes  Caregiver name: Marychuy Tucker  Caregiver contact info: [phone]  Support Systems: Parent, Family Member(s)  Housing / Facility: Other (Comments) (Multistory apartment. Pt lives on the 4th level with an elevator access)    Discharge Preparedness  What is your plan after discharge?: Home with help  What are your discharge supports?: Parent, Sibling, Grandparent  Prior Functional Level: Ambulatory, Independent with Activities of Daily Living, Independent with Medication Management    Functional Assesment  Prior Functional Level: Ambulatory, Independent with Activities of Daily Living, Independent with Medication Management    Finances  Financial Barriers to Discharge: No  Prescription Coverage: Yes    Vision / Hearing Impairment  Vision Impairment : Yes  Right Eye Vision: Wears Glasses  Left Eye Vision: Wears Glasses  Hearing Impairment : No    Domestic Abuse  Have you ever been the victim of abuse or violence?: No  Possible Abuse/Neglect Reported to:: Not Applicable    Psychological Assessment  History of Substance Abuse: None  History of Psychiatric Problems: Pt said Depression and Anxiety   Anticipated Discharge Information  Discharge Disposition: D/T to psych hosp or distinct part unit (65)

## 2025-01-10 NOTE — PROGRESS NOTES
Monitor Summary  Rhythm: SR  Rate: 69-91  Ectopy: N/A  Measurements: .14/.10/.38  ---12 hr Chart Review---

## 2025-01-10 NOTE — DISCHARGE PLANNING
Alert Team/Behavioral Health   Note:        Mental Health Transfer     (Rhoda) at [facility] called to accept patient.     Patient's accepting provider is Jamila DOMINGUEZ.     Requested [facility]  time is @ 1600.     Notified Attending Provider (James DOMINGUEZ), Bedside RN (Elaine WALKER), RN Case Manager (Jaziel TSE), and Alert Team DPA (Mariza CARDOZA) via Voalte message via Voalte message of acceptance.    Alert Team DPA (Mariza CARDOZA) to set up transport.

## 2025-01-11 LAB — 1,25(OH)2D3 SERPL-MCNC: 73 PG/ML (ref 19.9–79.3)
